# Patient Record
Sex: FEMALE | URBAN - METROPOLITAN AREA
[De-identification: names, ages, dates, MRNs, and addresses within clinical notes are randomized per-mention and may not be internally consistent; named-entity substitution may affect disease eponyms.]

---

## 2023-12-21 ENCOUNTER — LAB OUTSIDE AN ENCOUNTER (OUTPATIENT)
Dept: URBAN - METROPOLITAN AREA CLINIC 60 | Facility: CLINIC | Age: 58
End: 2023-12-21

## 2023-12-21 ENCOUNTER — OFFICE VISIT (OUTPATIENT)
Dept: URBAN - METROPOLITAN AREA CLINIC 60 | Facility: CLINIC | Age: 58
End: 2023-12-21
Payer: COMMERCIAL

## 2023-12-21 VITALS
WEIGHT: 114.4 LBS | RESPIRATION RATE: 20 BRPM | BODY MASS INDEX: 19.53 KG/M2 | DIASTOLIC BLOOD PRESSURE: 72 MMHG | OXYGEN SATURATION: 99 % | HEART RATE: 90 BPM | TEMPERATURE: 98.4 F | SYSTOLIC BLOOD PRESSURE: 112 MMHG | HEIGHT: 64 IN

## 2023-12-21 DIAGNOSIS — Z87.19 HISTORY OF ESOPHAGITIS: ICD-10-CM

## 2023-12-21 DIAGNOSIS — Q43.8 TORTUOUS COLON: ICD-10-CM

## 2023-12-21 DIAGNOSIS — R19.5 POSITIVE COLORECTAL CANCER SCREENING USING COLOGUARD TEST: ICD-10-CM

## 2023-12-21 DIAGNOSIS — K58.2 IRRITABLE BOWEL SYNDROME WITH BOTH CONSTIPATION AND DIARRHEA: ICD-10-CM

## 2023-12-21 PROBLEM — 10331000132107: Status: ACTIVE | Noted: 2023-12-21

## 2023-12-21 PROBLEM — 10743008: Status: ACTIVE | Noted: 2023-12-21

## 2023-12-21 PROCEDURE — 99204 OFFICE O/P NEW MOD 45 MIN: CPT

## 2023-12-21 RX ORDER — AMITRIPTYLINE HYDROCHLORIDE 10 MG/1
TAKE 1 TABLET BY MOUTH EVERY NIGHT TABLET, FILM COATED ORAL
Qty: 30 EACH | Refills: 0 | Status: ACTIVE | COMMUNITY

## 2023-12-21 RX ORDER — ESTRADIOL 2 MG/1
TAKE 1 TABLET BY MOUTH EVERY DAY TABLET ORAL
Qty: 90 EACH | Refills: 0 | Status: ACTIVE | COMMUNITY

## 2023-12-21 RX ORDER — BUPROPION HYDROCHLORIDE 150 MG/1
TABLET, EXTENDED RELEASE ORAL
Qty: 180 TABLET | Status: ACTIVE | COMMUNITY

## 2023-12-21 RX ORDER — POLYETHYLENE GLYCOL 3350, SODIUM CHLORIDE, SODIUM BICARBONATE, POTASSIUM CHLORIDE 420; 11.2; 5.72; 1.48 G/4L; G/4L; G/4L; G/4L
AS DIRECTED POWDER, FOR SOLUTION ORAL ONCE
Qty: 4000 | Refills: 0 | OUTPATIENT
Start: 2023-12-21 | End: 2023-12-22

## 2023-12-21 RX ORDER — METFORMIN ER 500 MG 500 MG/1
TAKE 1 TABLET BY MOUTH EVERY DAY TABLET ORAL
Qty: 90 EACH | Refills: 1 | Status: ACTIVE | COMMUNITY

## 2023-12-21 NOTE — HPI-TODAY'S VISIT:
Leigh Ann is a 58-year-old female presenting to the office today after a positive Cologuard. She does have a history of colonoscopy in 2016. However, per patient she has a very tortuous colon and the GI provider who did her colonoscopy told her that she should never proceed with colonoscopy ever again due to risk of perforation because her colon was so tortuous. 2016 colonoscopy findings discussed below, no polyps were noted. PCP most recently ordered a Cologuard and that was positive. Patient does have a history of IBS with alternating constipation and diarrhea. She has tried many medications in the past including Linzess but states that nothing has seemed to work well for her. She has multiple bowel movements per day that are all on the looser side as of right now. She does have a history of gastritis and esophagitis on 2016 EGD report. However, this has been under very good control as of recent and she states it has been well controlled. Otherwise, she has no complaints, questions, concerns. She denies melena, hematochezia, bright red blood per rectum, change in bowel habits, change in stool caliber, dysphagia, GERD, nausea/vomiting, hematemesis. . . 1/26/2016 labs; - Tissue transglutaminase IgA AB, TTA < 0.5 . 1/21/2016 labs; - TSH within normal limits - Vitamin B12 and folate within normal limits - CBC; .7 - CMP; albumin/globulin ratio 2.2, alkaline phosphatase 39 . Colonoscopy 2/2/2016; - Postprocedural diagnoses; 1. Nodular terminal ileum status post biopsy for inflammatory bowel disease 2. Fair prep requiring avid lavage for improved visibility of the entire colon 3. Very tortuous colon . EGD 2/2/2016; - Postprocedural diagnoses; 1. Normal duodenal mucosa 2. Antral erosive hyperemia status post biopsy for H. pylori 3. Gastric body hyperemia status post biopsy for H. pylori 4. Small hiatal hernia 5. Gastroesophageal junction at 39 cm, normal 6. A 1 cm tongue of Moreno's appearing mucosa status post biopsy for Moreno's esophagus 7. Normal mid and proximal esophageal mucosa . Cologuard 12/4/2023; positive . Pathology 2/4/2016; - Antral biopsies; reactive gastritis, immunostain negative for H. pylori, Alcian blue stain negative for intestinal metaplasia - Gastric body biopsy; reactive gastritis, negative for H. pylori and intestinal metaplasia - Distal esophagus biopsy; reflux esophagitis negative for Moreno's esophagus - Nodular ileum biopsy; normal ileal mucosa

## 2023-12-28 ENCOUNTER — TELEPHONE ENCOUNTER (OUTPATIENT)
Dept: URBAN - METROPOLITAN AREA CLINIC 63 | Facility: CLINIC | Age: 58
End: 2023-12-28

## 2024-01-01 ENCOUNTER — WEB ENCOUNTER (OUTPATIENT)
Dept: URBAN - METROPOLITAN AREA CLINIC 63 | Facility: CLINIC | Age: 59
End: 2024-01-01

## 2024-01-12 ENCOUNTER — TELEPHONE ENCOUNTER (OUTPATIENT)
Dept: URBAN - METROPOLITAN AREA CLINIC 60 | Facility: CLINIC | Age: 59
End: 2024-01-12

## 2024-02-05 ENCOUNTER — OV EP (OUTPATIENT)
Dept: URBAN - METROPOLITAN AREA CLINIC 60 | Facility: CLINIC | Age: 59
End: 2024-02-05
Payer: COMMERCIAL

## 2024-02-05 VITALS
OXYGEN SATURATION: 99 % | DIASTOLIC BLOOD PRESSURE: 72 MMHG | TEMPERATURE: 97.7 F | HEART RATE: 107 BPM | RESPIRATION RATE: 12 BRPM | BODY MASS INDEX: 18.57 KG/M2 | SYSTOLIC BLOOD PRESSURE: 120 MMHG | HEIGHT: 64 IN | WEIGHT: 108.8 LBS

## 2024-02-05 DIAGNOSIS — Q43.8 TORTUOUS COLON: ICD-10-CM

## 2024-02-05 DIAGNOSIS — K58.2 IRRITABLE BOWEL SYNDROME WITH BOTH CONSTIPATION AND DIARRHEA: ICD-10-CM

## 2024-02-05 DIAGNOSIS — R19.5 POSITIVE COLORECTAL CANCER SCREENING USING COLOGUARD TEST: ICD-10-CM

## 2024-02-05 PROCEDURE — 99213 OFFICE O/P EST LOW 20 MIN: CPT

## 2024-02-05 RX ORDER — METFORMIN ER 500 MG 500 MG/1
TAKE 1 TABLET BY MOUTH EVERY DAY TABLET ORAL
Qty: 90 EACH | Refills: 1 | Status: ACTIVE | COMMUNITY

## 2024-02-05 RX ORDER — AMITRIPTYLINE HYDROCHLORIDE 10 MG/1
TAKE 1 TABLET BY MOUTH EVERY NIGHT TABLET, FILM COATED ORAL
Qty: 30 EACH | Refills: 0 | Status: ACTIVE | COMMUNITY

## 2024-02-05 RX ORDER — ESTRADIOL 2 MG/1
TAKE 1 TABLET BY MOUTH EVERY DAY TABLET ORAL
Qty: 90 EACH | Refills: 0 | Status: ACTIVE | COMMUNITY

## 2024-02-05 RX ORDER — BUPROPION HYDROCHLORIDE 150 MG/1
TABLET, EXTENDED RELEASE ORAL
Qty: 180 TABLET | Status: ACTIVE | COMMUNITY

## 2024-02-05 NOTE — HPI-TODAY'S VISIT:
Leigh Ann is a 58-year-old female presenting to the office today for follow-up after CT colonography. Results are discussed in detail below. She does have a tortuous colon but there were no polyps noted and she was given a 5-year recall for her next CT colonography. She does have a history of IBS for which she adheres to lifestyle modifications and uses fiber. She does rarely have to use Linzess but takes it as needed. She states symptoms have been under good control and she has no complaints, questions, concerns. She denies change in bowel habits, change in stool caliber, unintentional weight loss/weight gain, abdominal pain, nausea/vomiting, hematemesis, reflux, dysphagia. . CT colonography 1/8/2024; Impression; - No large polyp, stricture or mass. The colon is tortuous. Recommend repeat CT colonography in 5 years.

## 2024-10-22 ENCOUNTER — DASHBOARD ENCOUNTERS (OUTPATIENT)
Age: 59
End: 2024-10-22

## 2024-10-22 ENCOUNTER — OFFICE VISIT (OUTPATIENT)
Dept: URBAN - METROPOLITAN AREA CLINIC 60 | Facility: CLINIC | Age: 59
End: 2024-10-22
Payer: COMMERCIAL

## 2024-10-22 VITALS
BODY MASS INDEX: 19.09 KG/M2 | SYSTOLIC BLOOD PRESSURE: 110 MMHG | HEART RATE: 103 BPM | HEIGHT: 64 IN | TEMPERATURE: 98.1 F | WEIGHT: 111.8 LBS | DIASTOLIC BLOOD PRESSURE: 66 MMHG | OXYGEN SATURATION: 99 %

## 2024-10-22 DIAGNOSIS — R19.4 CHANGE IN BOWEL HABITS: ICD-10-CM

## 2024-10-22 DIAGNOSIS — K58.0 IRRITABLE BOWEL SYNDROME WITH DIARRHEA: ICD-10-CM

## 2024-10-22 DIAGNOSIS — R10.9 ABDOMINAL CRAMPING: ICD-10-CM

## 2024-10-22 PROBLEM — 197125005: Status: ACTIVE | Noted: 2024-10-22

## 2024-10-22 PROCEDURE — 99214 OFFICE O/P EST MOD 30 MIN: CPT

## 2024-10-22 RX ORDER — AMITRIPTYLINE HYDROCHLORIDE 10 MG/1
TAKE 1 TABLET BY MOUTH EVERY NIGHT TABLET, FILM COATED ORAL
Qty: 30 EACH | Refills: 0 | Status: ACTIVE | COMMUNITY

## 2024-10-22 RX ORDER — BUPROPION HYDROCHLORIDE 150 MG/1
TABLET, EXTENDED RELEASE ORAL
Qty: 180 TABLET | Status: ACTIVE | COMMUNITY

## 2024-10-22 RX ORDER — DICYCLOMINE HYDROCHLORIDE 20 MG/1
1 TABLET TABLET ORAL THREE TIMES A DAY
Qty: 90 | Refills: 1 | OUTPATIENT
Start: 2024-10-22 | End: 2024-12-20

## 2024-10-22 RX ORDER — METFORMIN ER 500 MG 500 MG/1
TAKE 1 TABLET BY MOUTH EVERY DAY TABLET ORAL
Qty: 90 EACH | Refills: 1 | Status: ACTIVE | COMMUNITY

## 2024-10-22 RX ORDER — ESTRADIOL 2 MG/1
TAKE 1 TABLET BY MOUTH EVERY DAY TABLET ORAL
Qty: 90 EACH | Refills: 0 | Status: ACTIVE | COMMUNITY

## 2024-10-22 NOTE — HPI-TODAY'S VISIT:
Patient is a 58-year-old female, a patient of Dr. Benedict, last saw Isaak Sprague PA-C 2/2024 for follow-up after CT colonography.  Noted history of severe IBS with intermittent bouts of it.  At her last visit, she was doing well with good bowel control.  She comes in today anxious over new symptoms that developed starting 10/11/2024.  She states she started getting severe lower abdominal pain unlike anything she has gotten before in the past.  She went to the bathroom and had her typical loose/watery bowel movement and saw something that looked irregular in her stool a large rounded shape that did not look like stool.  It was also noted to be orange in color.  A few days later she additionally experienced severe abdominal pain and passed a similar solid piece that came out and was able to collect it and sanitize it she took pictures of it and dates and overall slightly orange color piece of matter that broke open.  She showed it to her nurse friend who told her it looked suspiciously like intestinal flukes and that she should go to her primary care doctor.  Her primary care doctor instructed her to go see GI immediately.  She additionally shows me pictures of intestinal fluids which do look similar to the picture she showed me of her stool.  She has not passed anything since and has not had pain in the last few days.  At her baseline, she tends to have anywhere from 5-10 loose and watery stools to sometimes more with abdominal cramping.  She has been told in the past to take antidiarrheal medications but she does not want to become dependent on them.  She also notes that when she tries to stop her loose stools it tends to give her more pain so she would prefer to just have watery stools without pain.  She occasionally does admit to constipation for which she will take Linzess to use as needed which does the trick.  No signs of GI bleeding, fever, chills, changes in appetite.  No unintentional weight loss. . CT colonography 1/8/2024; Impression; - No large polyp, stricture or mass. The colon is tortuous. Recommend repeat CT colonography in 5 years.

## 2024-10-22 NOTE — PHYSICAL EXAM CONSTITUTIONAL:
well developed, well nourished , in no acute distress , ambulating without difficulty , normal communication ability Wound Care: Vaseline

## 2024-10-27 ENCOUNTER — WEB ENCOUNTER (OUTPATIENT)
Dept: URBAN - METROPOLITAN AREA CLINIC 60 | Facility: CLINIC | Age: 59
End: 2024-10-27

## 2024-10-31 LAB
CAMPYLOBACTER SPP. AG,EIA: (no result)
OVA AND PARASITES, CONC AND PERM SMEAR: (no result)
SALMONELLA AND SHIGELLA, CULTURE: (no result)
SHIGA TOXINS, EIA W/RFL TO E.COLI O157 CULTURE: (no result)

## 2024-11-04 ENCOUNTER — TELEPHONE ENCOUNTER (OUTPATIENT)
Dept: URBAN - METROPOLITAN AREA CLINIC 63 | Facility: CLINIC | Age: 59
End: 2024-11-04

## 2024-12-05 ENCOUNTER — OFFICE VISIT (OUTPATIENT)
Dept: URBAN - METROPOLITAN AREA CLINIC 60 | Facility: CLINIC | Age: 59
End: 2024-12-05

## 2024-12-26 ENCOUNTER — ERX REFILL RESPONSE (OUTPATIENT)
Dept: URBAN - METROPOLITAN AREA CLINIC 60 | Facility: CLINIC | Age: 59
End: 2024-12-26

## 2024-12-26 RX ORDER — DICYCLOMINE HYDROCHLORIDE 20 MG/1
1 TABLET TABLET ORAL THREE TIMES A DAY
Qty: 90 | Refills: 3 | OUTPATIENT

## 2024-12-26 RX ORDER — DICYCLOMINE HYDROCHLORIDE 20 MG/1
TAKE ONE TABLET BY MOUTH THREE TIMES A DAY AS NEEDED TABLET ORAL
Qty: 90 TABLET | Refills: 2 | OUTPATIENT

## 2025-01-06 ENCOUNTER — COMPREHENSIVE EXAM (OUTPATIENT)
Age: 60
End: 2025-01-06

## 2025-01-06 DIAGNOSIS — H52.4: ICD-10-CM

## 2025-01-06 PROCEDURE — 92250 FUNDUS PHOTOGRAPHY W/I&R: CPT

## 2025-01-06 PROCEDURE — 92014 COMPRE OPH EXAM EST PT 1/>: CPT

## 2025-01-06 PROCEDURE — 92015 DETERMINE REFRACTIVE STATE: CPT
